# Patient Record
Sex: MALE | Race: OTHER | NOT HISPANIC OR LATINO
[De-identification: names, ages, dates, MRNs, and addresses within clinical notes are randomized per-mention and may not be internally consistent; named-entity substitution may affect disease eponyms.]

---

## 2021-07-14 ENCOUNTER — NON-APPOINTMENT (OUTPATIENT)
Age: 61
End: 2021-07-14

## 2021-07-14 PROBLEM — Z00.00 ENCOUNTER FOR PREVENTIVE HEALTH EXAMINATION: Status: ACTIVE | Noted: 2021-07-14

## 2021-07-26 ENCOUNTER — APPOINTMENT (OUTPATIENT)
Dept: SURGERY | Facility: CLINIC | Age: 61
End: 2021-07-26
Payer: COMMERCIAL

## 2021-07-26 VITALS
WEIGHT: 185.38 LBS | TEMPERATURE: 96.8 F | HEIGHT: 71 IN | DIASTOLIC BLOOD PRESSURE: 67 MMHG | BODY MASS INDEX: 25.95 KG/M2 | HEART RATE: 67 BPM | SYSTOLIC BLOOD PRESSURE: 104 MMHG | OXYGEN SATURATION: 97 %

## 2021-07-26 PROCEDURE — 99243 OFF/OP CNSLTJ NEW/EST LOW 30: CPT

## 2021-07-26 PROCEDURE — 99072 ADDL SUPL MATRL&STAF TM PHE: CPT

## 2021-07-26 NOTE — REASON FOR VISIT
[Consultation] : a consultation visit [FreeTextEntry1] : Consultation requested by: Dr. Cameron Mathur

## 2021-07-26 NOTE — CONSULT LETTER
[FreeTextEntry1] : 2021\par \par \par Cameron Mathur M.D.\par [address]\[place]  [address]\par Telephone #:  [phone]\par \par \par Re:  Cameron Beltre\par :  1960\par \par \par Dear Dr. Mathur:\par \par I had the opportunity to see Mr. Beltre today for evaluation and management of a mass of the right shoulder and the right forehead.  He stated the masses have been present for years.  His wife pointed the masses out to him.  He denied significant pain of the masses, although he stated moving makes him notice the mass of the right shoulder.  He stated the masses are not significantly enlarging.\par \par On physical examination, his height is 5 feet 11 inches, his weight is 185 pounds, and BMI is 25.85. His temperature is 96.8 °F, blood pressure is 104/67, heart rate is 67, and O2 saturation is 97% on room air. In general, he is a well-dressed well-nourished man who appears his stated age and is in no acute distress. He is calm, alert and oriented x3.  HEENT exam demonstrates a normocephalic atraumatic appearance with no scleral icterus.  His extremities are warm and dry without clubbing, cyanosis or edema.  His right shoulder has an approximately 3 cm soft, mobile, non-tender mass.  His right forehead near the hairline has an approximately 1 cm soft, mobile, non-tender mass.\par \par In summary, Mr. Beltre is a 61-year-old man with a right shoulder mass and a right forehead mass.  Because of the proximity of the right shoulder mass to the joint, we will perform an ultrasound of the area prior to excision.  We will plan for excision of the masses at the patient's convenience following confirmation with the ultrasound that it is a soft tissue mass and not joint-related.\par \par Thank you for the opportunity to care for this patient. Please do not hesitate to contact me in the event that you have any questions or concerns about the care of this patient.\par \par Sincerely,\par \par \par \par Melody Humphrey M.D.

## 2021-07-26 NOTE — HISTORY OF PRESENT ILLNESS
[de-identified] : Mr. Beltre presented today for evaluation and management of a mass of the right shoulder and the right forehead.  He stated the masses have been present for years.  His wife pointed the masses out to him.  He denied significant pain of the masses, although he stated moving makes him notice the mass of the right shoulder.  He stated the masses are not significantly enlarging.

## 2021-07-26 NOTE — ASSESSMENT
[FreeTextEntry1] : Mr. Beltre is a 61-year-old man with a right shoulder mass and a right forehead mass.  Because of the proximity of the right shoulder mass to the joint, we will perform an ultrasound of the area prior to excision.  We will plan for excision of the masses at the patient's convenience following confirmation with the ultrasound that it is a soft tissue mass and not joint-related.

## 2021-07-26 NOTE — PHYSICAL EXAM
[Calm] : calm [de-identified] : NAD, comfortable [de-identified] : NCAT, no scleral icterus [de-identified] : No clubbing, cyanosis, or edema. [de-identified] : Warm, dry.  Right shoulder has an approximately 3 cm soft, mobile, non-tender mass.  His right forehead near the hairline has an approximately 1 cm soft, mobile, non-tender mass. [de-identified] : A&Ox3

## 2021-08-05 ENCOUNTER — RESULT REVIEW (OUTPATIENT)
Age: 61
End: 2021-08-05

## 2021-08-05 ENCOUNTER — APPOINTMENT (OUTPATIENT)
Dept: ULTRASOUND IMAGING | Facility: CLINIC | Age: 61
End: 2021-08-05
Payer: COMMERCIAL

## 2021-08-05 ENCOUNTER — OUTPATIENT (OUTPATIENT)
Dept: OUTPATIENT SERVICES | Facility: HOSPITAL | Age: 61
LOS: 1 days | End: 2021-08-05

## 2021-08-05 PROCEDURE — 76882 US LMTD JT/FCL EVL NVASC XTR: CPT | Mod: 26,RT

## 2021-08-06 ENCOUNTER — NON-APPOINTMENT (OUTPATIENT)
Age: 61
End: 2021-08-06

## 2021-10-27 ENCOUNTER — TRANSCRIPTION ENCOUNTER (OUTPATIENT)
Age: 61
End: 2021-10-27

## 2021-10-27 ENCOUNTER — LABORATORY RESULT (OUTPATIENT)
Age: 61
End: 2021-10-27

## 2021-10-28 ENCOUNTER — OUTPATIENT (OUTPATIENT)
Dept: OUTPATIENT SERVICES | Facility: HOSPITAL | Age: 61
LOS: 1 days | Discharge: HOME | End: 2021-10-28
Payer: COMMERCIAL

## 2021-10-28 ENCOUNTER — APPOINTMENT (OUTPATIENT)
Dept: SURGERY | Facility: HOSPITAL | Age: 61
End: 2021-10-28

## 2021-10-28 ENCOUNTER — RESULT REVIEW (OUTPATIENT)
Age: 61
End: 2021-10-28

## 2021-10-28 PROCEDURE — 21011 EXC FACE LES SC <2 CM: CPT

## 2021-10-28 PROCEDURE — 88304 TISSUE EXAM BY PATHOLOGIST: CPT | Mod: 26

## 2021-10-28 PROCEDURE — 23071 EXC SHOULDER LES SC 3 CM/>: CPT

## 2021-10-28 RX ORDER — DOCUSATE SODIUM 100 MG
1 CAPSULE ORAL
Qty: 20 | Refills: 0
Start: 2021-10-28

## 2021-10-28 RX ORDER — OXYCODONE HYDROCHLORIDE 5 MG/1
1 TABLET ORAL
Qty: 4 | Refills: 0
Start: 2021-10-28

## 2021-10-29 ENCOUNTER — NON-APPOINTMENT (OUTPATIENT)
Age: 61
End: 2021-10-29

## 2021-11-09 ENCOUNTER — APPOINTMENT (OUTPATIENT)
Dept: SURGERY | Facility: CLINIC | Age: 61
End: 2021-11-09
Payer: COMMERCIAL

## 2021-11-09 VITALS
SYSTOLIC BLOOD PRESSURE: 120 MMHG | OXYGEN SATURATION: 99 % | HEART RATE: 70 BPM | BODY MASS INDEX: 25.9 KG/M2 | TEMPERATURE: 97.6 F | DIASTOLIC BLOOD PRESSURE: 78 MMHG | HEIGHT: 71 IN | WEIGHT: 185 LBS | RESPIRATION RATE: 16 BRPM

## 2021-11-09 DIAGNOSIS — R22.30 LOCALIZED SWELLING, MASS AND LUMP, UNSPECIFIED UPPER LIMB: ICD-10-CM

## 2021-11-09 DIAGNOSIS — R22.0 LOCALIZED SWELLING, MASS AND LUMP, HEAD: ICD-10-CM

## 2021-11-09 DIAGNOSIS — Z86.19 PERSONAL HISTORY OF OTHER INFECTIOUS AND PARASITIC DISEASES: ICD-10-CM

## 2021-11-09 DIAGNOSIS — Z78.9 OTHER SPECIFIED HEALTH STATUS: ICD-10-CM

## 2021-11-09 DIAGNOSIS — Z80.9 FAMILY HISTORY OF MALIGNANT NEOPLASM, UNSPECIFIED: ICD-10-CM

## 2021-11-09 LAB — SURGICAL PATHOLOGY STUDY: SIGNIFICANT CHANGE UP

## 2021-11-09 PROCEDURE — 99024 POSTOP FOLLOW-UP VISIT: CPT

## 2021-11-09 NOTE — REASON FOR VISIT
[Post Op: _________] : a [unfilled] post op visit [FreeTextEntry1] : He underwent excision of a right forehead mass and a right shoulder mass on October 28, 2021.

## 2021-11-09 NOTE — ASSESSMENT
[FreeTextEntry1] : Mr. Beltre is a 61-year-old man who underwent excision of a right forehead mass and a right shoulder mass on October 28, 2021.  He is recovering as expected and will follow up with me as needed.  The pathology was not available at the time of the patient's visit but will be reviewed with him once it is resulted.

## 2021-11-09 NOTE — HISTORY OF PRESENT ILLNESS
[de-identified] : Mr. Beltre presented previously for evaluation and management of a mass of the right shoulder and the right forehead.  He stated the masses have been present for years.  His wife pointed the masses out to him.  He denied significant pain of the masses, although he stated moving makes him notice the mass of the right shoulder.  He stated the masses are not significantly enlarging.  He underwent excision of a right forehead mass and a right shoulder mass on October 28, 2021. [de-identified] : He presented today for a routine post-operative visit.  He is overall feeling well.  He denied fever, chills, nausea, vomiting, diarrhea, or constipation.

## 2021-11-09 NOTE — PHYSICAL EXAM
[Calm] : calm [de-identified] : NAD, comfortable [de-identified] : NCAT, no scleral icterus [de-identified] : No clubbing, cyanosis, or edema. [de-identified] : Warm, dry.  Right shoulder and right forehead incisions healing well without erythema or induration. [de-identified] : A&Ox3

## 2021-11-09 NOTE — CONSULT LETTER
[FreeTextEntry1] : 2021\par \par \par \par Cameron Mathur M.D.\par [address]\[place] [address]\par Telephone #: [phone]\par \par \par Re: Cameron Beltre\par : 1960\par \par \par Dear Dr. Mathur:\par \par I had the opportunity to see Mr. Beltre today for a routine post-operative visit after he underwent excision of a right forehead mass and a right shoulder mass on 2021.  He is overall feeling well.  He denied fever, chills, nausea, vomiting, diarrhea, or constipation.\par \par On physical examination, his height is 5 feet 11 inches, his weight is 185 pounds, and BMI is 25.8. His temperature is 97.6 °F, blood pressure is 120/78, heart rate is 70, and O2 saturation is 99% on room air. In general, he is a well-dressed well-nourished man who appears his stated age and is in no acute distress. He is calm, alert and oriented x3. HEENT exam demonstrates a normocephalic atraumatic appearance with no scleral icterus. His extremities are warm and dry without clubbing, cyanosis or edema. His right shoulder and right forehead incisions are healing well without erythema or induration.\par \par In summary, Mr. Beltre is a 61-year-old man who underwent excision of a right forehead mass and a right shoulder mass on 2021.  He is recovering as expected and will follow up with me as needed.  The pathology was not available at the time of the patient's visit but will be reviewed with him once it is resulted.\par \par Thank you for the opportunity to care for this patient. Please do not hesitate to contact me in the event that you have any questions or concerns about the care of this patient.\par \par Sincerely,\par \par \par \par Melody Humphrey M.D.

## 2021-11-10 ENCOUNTER — NON-APPOINTMENT (OUTPATIENT)
Age: 61
End: 2021-11-10